# Patient Record
Sex: FEMALE | Race: WHITE | HISPANIC OR LATINO | URBAN - METROPOLITAN AREA
[De-identification: names, ages, dates, MRNs, and addresses within clinical notes are randomized per-mention and may not be internally consistent; named-entity substitution may affect disease eponyms.]

---

## 2018-11-08 ENCOUNTER — EMERGENCY (EMERGENCY)
Facility: HOSPITAL | Age: 38
LOS: 1 days | End: 2018-11-08
Payer: SELF-PAY

## 2018-11-08 PROCEDURE — 99283 EMERGENCY DEPT VISIT LOW MDM: CPT | Mod: 25

## 2018-11-08 PROCEDURE — 10061 I&D ABSCESS COMP/MULTIPLE: CPT

## 2019-02-01 ENCOUNTER — EMERGENCY (EMERGENCY)
Facility: HOSPITAL | Age: 39
LOS: 1 days | End: 2019-02-01
Payer: SELF-PAY

## 2019-02-01 PROCEDURE — 99283 EMERGENCY DEPT VISIT LOW MDM: CPT

## 2023-12-05 ENCOUNTER — APPOINTMENT (OUTPATIENT)
Dept: CARDIOLOGY | Facility: CLINIC | Age: 43
End: 2023-12-05
Payer: MEDICAID

## 2023-12-05 ENCOUNTER — NON-APPOINTMENT (OUTPATIENT)
Age: 43
End: 2023-12-05

## 2023-12-05 VITALS — SYSTOLIC BLOOD PRESSURE: 112 MMHG | DIASTOLIC BLOOD PRESSURE: 76 MMHG

## 2023-12-05 VITALS
DIASTOLIC BLOOD PRESSURE: 70 MMHG | SYSTOLIC BLOOD PRESSURE: 110 MMHG | HEART RATE: 76 BPM | HEIGHT: 60 IN | BODY MASS INDEX: 35.73 KG/M2 | OXYGEN SATURATION: 97 % | WEIGHT: 182 LBS

## 2023-12-05 DIAGNOSIS — F32.A DEPRESSION, UNSPECIFIED: ICD-10-CM

## 2023-12-05 DIAGNOSIS — R05.3 CHRONIC COUGH: ICD-10-CM

## 2023-12-05 DIAGNOSIS — K21.9 GASTRO-ESOPHAGEAL REFLUX DISEASE W/OUT ESOPHAGITIS: ICD-10-CM

## 2023-12-05 DIAGNOSIS — Z78.9 OTHER SPECIFIED HEALTH STATUS: ICD-10-CM

## 2023-12-05 DIAGNOSIS — Z83.3 FAMILY HISTORY OF DIABETES MELLITUS: ICD-10-CM

## 2023-12-05 DIAGNOSIS — R07.89 OTHER CHEST PAIN: ICD-10-CM

## 2023-12-05 DIAGNOSIS — F41.9 ANXIETY DISORDER, UNSPECIFIED: ICD-10-CM

## 2023-12-05 DIAGNOSIS — R06.00 DYSPNEA, UNSPECIFIED: ICD-10-CM

## 2023-12-05 DIAGNOSIS — Z84.1 FAMILY HISTORY OF DISORDERS OF KIDNEY AND URETER: ICD-10-CM

## 2023-12-05 DIAGNOSIS — R94.31 ABNORMAL ELECTROCARDIOGRAM [ECG] [EKG]: ICD-10-CM

## 2023-12-05 PROBLEM — Z00.00 ENCOUNTER FOR PREVENTIVE HEALTH EXAMINATION: Status: ACTIVE | Noted: 2023-12-05

## 2023-12-05 PROCEDURE — 99204 OFFICE O/P NEW MOD 45 MIN: CPT

## 2023-12-05 RX ORDER — FAMOTIDINE 20 MG/1
20 TABLET, FILM COATED ORAL
Qty: 90 | Refills: 3 | Status: ACTIVE | COMMUNITY
Start: 2023-12-05

## 2023-12-05 RX ORDER — ESCITALOPRAM OXALATE 10 MG/1
10 TABLET, FILM COATED ORAL
Qty: 90 | Refills: 3 | Status: ACTIVE | COMMUNITY
Start: 2023-12-05

## 2023-12-05 RX ORDER — MELOXICAM 15 MG/1
15 TABLET ORAL
Refills: 0 | Status: DISCONTINUED | COMMUNITY
End: 2023-12-05

## 2023-12-14 ENCOUNTER — LABORATORY RESULT (OUTPATIENT)
Age: 43
End: 2023-12-14

## 2023-12-14 ENCOUNTER — APPOINTMENT (OUTPATIENT)
Dept: OBGYN | Facility: CLINIC | Age: 43
End: 2023-12-14
Payer: MEDICAID

## 2023-12-14 VITALS
BODY MASS INDEX: 36.32 KG/M2 | HEIGHT: 60 IN | WEIGHT: 185 LBS | DIASTOLIC BLOOD PRESSURE: 85 MMHG | SYSTOLIC BLOOD PRESSURE: 129 MMHG

## 2023-12-14 DIAGNOSIS — Z12.4 ENCOUNTER FOR SCREENING FOR MALIGNANT NEOPLASM OF CERVIX: ICD-10-CM

## 2023-12-14 DIAGNOSIS — Z01.419 ENCOUNTER FOR GYNECOLOGICAL EXAMINATION (GENERAL) (ROUTINE) W/OUT ABNORMAL FINDINGS: ICD-10-CM

## 2023-12-14 DIAGNOSIS — Z12.39 ENCOUNTER FOR OTHER SCREENING FOR MALIGNANT NEOPLASM OF BREAST: ICD-10-CM

## 2023-12-14 PROCEDURE — 99386 PREV VISIT NEW AGE 40-64: CPT

## 2023-12-14 NOTE — HISTORY OF PRESENT ILLNESS
[FreeTextEntry1] : 42 yo in for annual exam Patient reports irregular menses for several years   Denies diabetes  No current bcm denies breast pain/breast mass/nipple dc/skin dimpling Mammogram appointment is already scheduled denies frequent constipation or rectal bleeding or blood in stools denies toxic habits  Fam Hx  maternal cousin breast cancer  PSHx  appendectomy and left ovarian cystectomy 2 years ago

## 2023-12-14 NOTE — PLAN
[FreeTextEntry1] : thin prep with hr hpv Pelvic US  PCOs lab profile  Patient has mammogram scheduled  declines bcm  follow up ~ 2 weeks for results and discussion

## 2023-12-14 NOTE — PHYSICAL EXAM
[Chaperone Present] : A chaperone was present in the examining room during all aspects of the physical examination [FreeTextEntry1] : Traci [Appropriately responsive] : appropriately responsive [Alert] : alert [No Acute Distress] : no acute distress [No Lymphadenopathy] : no lymphadenopathy [Regular Rate Rhythm] : regular rate rhythm [No Murmurs] : no murmurs [Clear to Auscultation B/L] : clear to auscultation bilaterally [Soft] : soft [Non-tender] : non-tender [Non-distended] : non-distended [No Mass] : no mass [Oriented x3] : oriented x3 [FreeTextEntry6] : no breast mass    no skin dimpling   no nipple dc [FreeTextEntry7] : scar from ovarian cystectomy [Examination Of The Breasts] : a normal appearance [No Discharge] : no discharge [No Masses] : no breast masses were palpable [No Lesions] : no lesions  [Labia Majora] : normal [Labia Minora] : normal [Pink Rugae] : pink rugae [No Bleeding] : There was no active vaginal bleeding [Normal] : normal [Normal Position] : in a normal position [Tenderness] : nontender [Enlarged ___ wks] : not enlarged [Mass ___ cm] : no uterine mass was palpated [Uterine Adnexae] : normal [Declined] : Patient declined rectal exam

## 2023-12-15 LAB
DHEA-S SERPL-MCNC: 132 UG/DL
FSH SERPL-MCNC: 4.1 IU/L
HCG SERPL-MCNC: <1 MIU/ML
HCG UR QL: NEGATIVE
HPV HIGH+LOW RISK DNA PNL CVX: NOT DETECTED
LH SERPL-ACNC: 13.6 IU/L
PROLACTIN SERPL-MCNC: 23.4 NG/ML
QUALITY CONTROL: YES
TESTOST FREE SERPL-MCNC: 1 PG/ML
TESTOST SERPL-MCNC: 18.2 NG/DL
TSH SERPL-ACNC: 4.73 UIU/ML

## 2023-12-18 LAB — CYTOLOGY CVX/VAG DOC THIN PREP: NORMAL

## 2023-12-19 ENCOUNTER — APPOINTMENT (OUTPATIENT)
Dept: CARDIOLOGY | Facility: CLINIC | Age: 43
End: 2023-12-19

## 2023-12-19 LAB
17OHP SERPL-MCNC: 48 NG/DL
ANDROST SERPL-MCNC: 88 NG/DL

## 2023-12-20 ENCOUNTER — APPOINTMENT (OUTPATIENT)
Dept: ANTEPARTUM | Facility: CLINIC | Age: 43
End: 2023-12-20
Payer: MEDICAID

## 2023-12-20 ENCOUNTER — ASOB RESULT (OUTPATIENT)
Age: 43
End: 2023-12-20

## 2023-12-20 PROCEDURE — 76856 US EXAM PELVIC COMPLETE: CPT | Mod: 59

## 2023-12-20 PROCEDURE — 76830 TRANSVAGINAL US NON-OB: CPT

## 2023-12-28 ENCOUNTER — APPOINTMENT (OUTPATIENT)
Dept: OBGYN | Facility: CLINIC | Age: 43
End: 2023-12-28
Payer: MEDICAID

## 2023-12-28 VITALS
HEIGHT: 64 IN | BODY MASS INDEX: 31.58 KG/M2 | SYSTOLIC BLOOD PRESSURE: 116 MMHG | DIASTOLIC BLOOD PRESSURE: 73 MMHG | WEIGHT: 185 LBS

## 2023-12-28 DIAGNOSIS — R21 RASH AND OTHER NONSPECIFIC SKIN ERUPTION: ICD-10-CM

## 2023-12-28 DIAGNOSIS — E03.9 HYPOTHYROIDISM, UNSPECIFIED: ICD-10-CM

## 2023-12-28 DIAGNOSIS — N92.6 IRREGULAR MENSTRUATION, UNSPECIFIED: ICD-10-CM

## 2023-12-28 DIAGNOSIS — N84.0 POLYP OF CORPUS UTERI: ICD-10-CM

## 2023-12-28 PROCEDURE — 99213 OFFICE O/P EST LOW 20 MIN: CPT

## 2023-12-28 NOTE — PHYSICAL EXAM
[Chaperone Present] : A chaperone was present in the examining room during all aspects of the physical examination [FreeTextEntry1] : L [Appropriately responsive] : appropriately responsive [Alert] : alert [No Acute Distress] : no acute distress [Oriented x3] : oriented x3

## 2023-12-28 NOTE — PLAN
[FreeTextEntry1] : set up MyoSure Hysteroscopy D&C and insertion of Mirena IUD  patient to follow up with PCP for evaluation of skin lesions and elevated TSH

## 2023-12-28 NOTE — HISTORY OF PRESENT ILLNESS
[FreeTextEntry1] : 44 yo in for follow up after lab work and Pelvic US for heavy and irregular bleeding. TSH elevated and Pelvic US demonstrates endometrial polyp   Simple right ovarian cyst of no clinical signficiance  Patient reports rash on body and face since Wednesday   Reports  had similar lesions within 1 month Reports fevers  Patient encouraged to follow up with PCP for further evaluation of lesions and further follow up on elevated TSH patient voiced understanding  Patient offered MyoSure Hysteroscopy d&c for removal of endometrial polyp and insertion of Mirena IUD to help control heavy menses patient agrees

## 2024-01-04 ENCOUNTER — APPOINTMENT (OUTPATIENT)
Dept: CARDIOLOGY | Facility: CLINIC | Age: 44
End: 2024-01-04

## 2024-01-18 ENCOUNTER — APPOINTMENT (OUTPATIENT)
Dept: MAMMOGRAPHY | Facility: CLINIC | Age: 44
End: 2024-01-18
Payer: MEDICAID

## 2024-01-18 PROCEDURE — 77067 SCR MAMMO BI INCL CAD: CPT

## 2024-01-18 PROCEDURE — 77063 BREAST TOMOSYNTHESIS BI: CPT

## 2024-01-19 ENCOUNTER — APPOINTMENT (OUTPATIENT)
Dept: UROGYNECOLOGY | Facility: CLINIC | Age: 44
End: 2024-01-19
Payer: MEDICAID

## 2024-01-19 VITALS
SYSTOLIC BLOOD PRESSURE: 110 MMHG | HEIGHT: 72 IN | WEIGHT: 180 LBS | BODY MASS INDEX: 24.38 KG/M2 | DIASTOLIC BLOOD PRESSURE: 82 MMHG

## 2024-01-19 DIAGNOSIS — N89.8 OTHER SPECIFIED NONINFLAMMATORY DISORDERS OF VAGINA: ICD-10-CM

## 2024-01-19 PROCEDURE — 99459 PELVIC EXAMINATION: CPT | Mod: 25

## 2024-01-19 PROCEDURE — 51701 INSERT BLADDER CATHETER: CPT | Mod: 59

## 2024-01-19 PROCEDURE — 99204 OFFICE O/P NEW MOD 45 MIN: CPT | Mod: 25

## 2024-01-19 PROCEDURE — 81003 URINALYSIS AUTO W/O SCOPE: CPT | Mod: QW

## 2024-01-19 NOTE — PROCEDURE
[Straight Catheterization] : insertion of a straight catheter [Stress Incontinence] : stress incontinence [Patient] : the patient [___ Fr Straight Tip] : a [unfilled] in [place] straight tip catheter [None] : there were no complications with the catheter insertion [Clear] : clear [Culture] : culture [Urinalysis] : urinalysis [No Complications] : no complications [Tolerated Well] : the patient tolerated the procedure well [Post procedure instructions and information given] : Post procedure instructions and information were given and reviewed with patient. [0] : 0

## 2024-01-19 NOTE — DISCUSSION/SUMMARY
[FreeTextEntry1] : The patient was counseled regarding the pathophysiology, evaluation and management of mixed urinary incontinence.  She was also counseled regarding the risks, benefits, indications, and alternatives of further evaluations studies, as well as various management options. She was given verbal and written information/education on pelvic floor muscle exercises, avoidance of dietary bladder irritants, and other strategies to improve bladder control. She was counseled regarding treatment options for stress urinary incontinence including pelvic floor PT, pessary placement and surgical management with midurethral sling. AUGS interactive tool was used to explain normal anatomy as well as alteration in urethral support associated with stress urinary incontinence. Midurethral sling placement as well as urethral bulking was discussed. Pharmacologic management of overactive bladder and urgency incontinence was discussed. After a detailed discussion, following management plan was outlined: 1.  Patient is interested in surgical management of stress urinary incontinence.  We discussed the procedure of retropubic mid urethral sling placement.  Outpatient nature of the procedure, postop restrictions etc. reviewed.  Advised her to undergo urodynamic testing to exclude any contraindication for the procedure.  She verbalized understanding.  Urodynamic testing ordered.  OR booking sheet submitted.  She is interested in a combined procedure.  I have sent a message to Dr. Garsia to coordinate surgical planning.   2. UA with micro and urine culture was ordered to exclude UTI.  If there is evidence of urinary tract infection, will recommend antibiotic such as Keflex 500 mg p.o. twice daily for 7 days. 3.  Discussed management of urgency symptoms with intake of medication such as Myrbetriq 25 mg p.o. daily or Solifenacin 5 mg p.o. daily.  She would like to undergo the urodynamic testing first. 4. Discussed importance of maintaining optimal glycemic control in for bladder health. 5.  Advised following following up with her PCP for thyroid nodule and repeat thyroid function test.  She verbalized understanding Of note, patient is Latvian-speaking.  The entire visit was conducted with the help of Latvian translation provided by medical assistant Arielle Guzmán per patient's preference.

## 2024-01-19 NOTE — HISTORY OF PRESENT ILLNESS
[FreeTextEntry1] :  patient is a 43-year-old para 3 ( x 3) who is referred by Fr. Garsia for evaluation and management of  patient reports leaking with cough, sneeze, laugh sex for past 10 years with gradual worsening over the past 5 years. She went to a doctor in [place] ~ 10 years ago who advised her to undergo PT which didn't make a significant difference. She also reports occasional strong urge to void and sometimes doesn't make it Pad use : typically 1 pad last whole day Daytime frequency: 5 times Nocturia: 0 (stops drinking fluid in the night) Sensation of incomplete bladder emptying:  she never feels empyty Recurrent UTI: denies  Kidney stones: denies Hx of hematuria: denies   Vaginal symptoms: denies vaginal bulge or pressure. Reports pain with sex, not using any lubricant  Bowel sym[ptoms: reports chronic constipation- not on any bowel regimen, denies diarrhea, denies fecal incontinence   GYN Hx: LMP: irregular / heavy period for past 13 years.  Her recent pelvic under ultrasound showed endometrial polyp.  Her GYN Dr. Garsia has recommended hysteroscopy/polypectomy/ Mirena IUD placement.  She has been receiving calls from his office but has not scheduled her surgery yet  PMHx: obesity, prediabetes- not on any medication, hypothyroidism (borderline- not on any medication)  PShx: Laparotomy for appendectomy in  at [facility], laparotomy for ovarian cystectomy in 2020 in [facility]

## 2024-01-19 NOTE — PHYSICAL EXAM
[Chaperone Present] : A chaperone was present in the examining room during all aspects of the physical examination [FreeTextEntry1] : General: Not in acute distress, alert and oriented x3. Neck: Supple. No lymphadenopathy. ? small thyroid nodule Abdomen: Soft, nontender, and nondistended. No obvious hepatosplenomegaly. No obvious hernias. A well-healed RLQ scar of previous appendectomy, Suprapubic scar of previous laparotomy for ovarian cystectomy.  Pelvic Exam: Normal external female genitalia. Saddle sensory exam S2 to S4 is intact. Perineal reflexes not visualized. Urethra is hypermobile without prolapse, exudates, or lesions. Cough stress test is POSITIVE with loss of brisk volume of urine.  Post void residual was checked with I/O cath and was 90 cc of clear urine.  Cough stress test was negative after In-N-Out cath.  Normal appearing vaginal epithelium. No vaginal blood but yellowish green discharge was noted with odor.  Affirm and GC swabs were collected.   Cervix without abnormal lesions. Bimanual exam reveals a small uterus in normal positioning. No palpable adnexal masses or tenderness.  Vaginal walls are parous however no prolapse noted to or beyond hymen.

## 2024-01-19 NOTE — ASSESSMENT
[FreeTextEntry1] : Patient is a 43-year-old multipara with symptoms of mixed urinary incontinence (stress greater than urgency) and coital incontinence for past 10 years with gradual worsening over the past 5 years.  On exam she has a positive cough stress test prior to In-N-Out cath, hypermobile urethra and a normal postvoid residual.  Patient is also experiencing heavy irregular bleeding.  She has a endometrial polyp.  She has been advised to undergo hysteroscopy, D&C/polypectomy, Mirena IUD insertion.

## 2024-01-22 DIAGNOSIS — N76.0 ACUTE VAGINITIS: ICD-10-CM

## 2024-01-22 DIAGNOSIS — B96.89 ACUTE VAGINITIS: ICD-10-CM

## 2024-01-22 LAB
APPEARANCE: CLEAR
BACTERIA UR CULT: NORMAL
BACTERIA: NEGATIVE /HPF
BILIRUBIN URINE: NEGATIVE
BLOOD URINE: NEGATIVE
C TRACH RRNA SPEC QL NAA+PROBE: NOT DETECTED
CANDIDA VAG CYTO: NOT DETECTED
CAST: 0 /LPF
COLOR: YELLOW
EPITHELIAL CELLS: 1 /HPF
G VAGINALIS+PREV SP MTYP VAG QL MICRO: DETECTED
GLUCOSE QUALITATIVE U: NEGATIVE MG/DL
KETONES URINE: NEGATIVE MG/DL
LEUKOCYTE ESTERASE URINE: NEGATIVE
MICROSCOPIC-UA: NORMAL
N GONORRHOEA RRNA SPEC QL NAA+PROBE: NOT DETECTED
NITRITE URINE: NEGATIVE
PH URINE: 6
PROTEIN URINE: NEGATIVE MG/DL
RED BLOOD CELLS URINE: 0 /HPF
SOURCE AMPLIFICATION: NORMAL
SPECIFIC GRAVITY URINE: 1.02
T VAGINALIS VAG QL WET PREP: NOT DETECTED
UROBILINOGEN URINE: 0.2 MG/DL
WHITE BLOOD CELLS URINE: 0 /HPF

## 2024-01-22 RX ORDER — METRONIDAZOLE 500 MG/1
500 TABLET ORAL
Qty: 14 | Refills: 0 | Status: ACTIVE | COMMUNITY
Start: 2024-01-22 | End: 1900-01-01

## 2024-01-23 ENCOUNTER — APPOINTMENT (OUTPATIENT)
Dept: ULTRASOUND IMAGING | Facility: CLINIC | Age: 44
End: 2024-01-23
Payer: MEDICAID

## 2024-01-23 PROCEDURE — 76536 US EXAM OF HEAD AND NECK: CPT

## 2024-02-01 ENCOUNTER — APPOINTMENT (OUTPATIENT)
Dept: ULTRASOUND IMAGING | Facility: CLINIC | Age: 44
End: 2024-02-01
Payer: MEDICAID

## 2024-02-01 ENCOUNTER — APPOINTMENT (OUTPATIENT)
Dept: MAMMOGRAPHY | Facility: CLINIC | Age: 44
End: 2024-02-01
Payer: MEDICAID

## 2024-02-01 PROCEDURE — 76642 ULTRASOUND BREAST LIMITED: CPT | Mod: RT

## 2024-02-01 PROCEDURE — 77065 DX MAMMO INCL CAD UNI: CPT | Mod: LT

## 2024-02-06 ENCOUNTER — APPOINTMENT (OUTPATIENT)
Dept: UROGYNECOLOGY | Facility: CLINIC | Age: 44
End: 2024-02-06

## 2024-02-16 ENCOUNTER — APPOINTMENT (OUTPATIENT)
Dept: UROGYNECOLOGY | Facility: CLINIC | Age: 44
End: 2024-02-16
Payer: MEDICAID

## 2024-02-16 DIAGNOSIS — N39.46 MIXED INCONTINENCE: ICD-10-CM

## 2024-02-16 DIAGNOSIS — N36.8 OTHER SPECIFIED DISORDERS OF URETHRA: ICD-10-CM

## 2024-02-16 DIAGNOSIS — N39.3 STRESS INCONTINENCE (FEMALE) (MALE): ICD-10-CM

## 2024-02-16 PROCEDURE — 51784 ANAL/URINARY MUSCLE STUDY: CPT

## 2024-02-16 PROCEDURE — 51728 CYSTOMETROGRAM W/VP: CPT

## 2024-02-16 PROCEDURE — 51741 ELECTRO-UROFLOWMETRY FIRST: CPT

## 2024-02-16 PROCEDURE — 81003 URINALYSIS AUTO W/O SCOPE: CPT | Mod: QW

## 2024-02-16 PROCEDURE — 51797 INTRAABDOMINAL PRESSURE TEST: CPT

## 2024-02-16 PROCEDURE — 99214 OFFICE O/P EST MOD 30 MIN: CPT | Mod: 25

## 2024-02-16 RX ORDER — PHENAZOPYRIDINE 200 MG/1
200 TABLET, FILM COATED ORAL 3 TIMES DAILY
Qty: 15 | Refills: 0 | Status: ACTIVE | COMMUNITY
Start: 2024-02-16 | End: 1900-01-01

## 2024-02-17 LAB
APPEARANCE: CLEAR
BACTERIA: NEGATIVE /HPF
BILIRUBIN URINE: NEGATIVE
BLOOD URINE: NEGATIVE
CAST: 0 /LPF
COLOR: YELLOW
EPITHELIAL CELLS: 1 /HPF
GLUCOSE QUALITATIVE U: NEGATIVE MG/DL
KETONES URINE: NEGATIVE MG/DL
LEUKOCYTE ESTERASE URINE: NEGATIVE
MICROSCOPIC-UA: NORMAL
NITRITE URINE: NEGATIVE
PH URINE: 6.5
PROTEIN URINE: NEGATIVE MG/DL
RED BLOOD CELLS URINE: 0 /HPF
SPECIFIC GRAVITY URINE: 1
UROBILINOGEN URINE: 0.2 MG/DL
WHITE BLOOD CELLS URINE: 0 /HPF

## 2024-02-19 LAB — BACTERIA UR CULT: NORMAL

## 2024-03-01 ENCOUNTER — NON-APPOINTMENT (OUTPATIENT)
Age: 44
End: 2024-03-01

## 2024-03-01 ENCOUNTER — APPOINTMENT (OUTPATIENT)
Dept: UROGYNECOLOGY | Facility: CLINIC | Age: 44
End: 2024-03-01
Payer: MEDICAID

## 2024-03-01 PROCEDURE — 99214 OFFICE O/P EST MOD 30 MIN: CPT

## 2024-03-01 NOTE — PHYSICAL EXAM
[Chaperone Present] : A chaperone was present in the examining room during all aspects of the physical examination [No Acute Distress] : in no acute distress [Well developed] : well developed [Oriented x3] : oriented to person, place, and time [FreeTextEntry1] : Arielle

## 2024-03-01 NOTE — HISTORY OF PRESENT ILLNESS
[FreeTextEntry1] : Patient is a 43-year-old multipara with symptoms of mixed urinary incontinence (stress greater than urgency) and coital incontinence for past 10 years with gradual worsening over the past 5 years. On exam she has a positive cough stress test prior to In-N-Out cath, hypermobile urethra and a normal postvoid residual. Patient is also experiencing heavy irregular bleeding. She has a endometrial polyp. She has been advised to undergo hysteroscopy, D&C/polypectomy, Mirena IUD insertion.  She presented for urodynamic testing on 3/16/2024.  Her urodynamic test findings include an interrupted uroflow with voided volume of 310 cc, max flow rate of 10, average flow rate of 4, and voiding time of 73 seconds: Her complex cystometrogram showed normal sensation, cystometric capacity of 300 cc, absence of detrusor overactivity during filling and presence of a stress urinary incontinence with cough and Valsalva starting at filled volume of 100 cc; she voided 476 cc for pressure voiding study with max flow rate of 22 and detrusor pressure of 12 cm of water.

## 2024-03-01 NOTE — ASSESSMENT
[FreeTextEntry1] :  The above test findings are consistent with normal sensation, normal compliance, normal cystometric capacity, absence of detrusor overactivity, presence of a stress urinary incontinence and absence of voiding dysfunction.  Patient appears to be a reasonable candidate for retropubic mid urethral sling placement.

## 2024-03-02 NOTE — HISTORY OF PRESENT ILLNESS
[FreeTextEntry1] : Patient is a 43-year-old multipara with symptoms of mixed urinary incontinence (stress greater than urgency) and coital incontinence for past 10 years with gradual worsening over the past 5 years. On exam she has a positive cough stress test prior to In-N-Out cath, hypermobile urethra and a normal postvoid residual. Patient is also experiencing heavy irregular bleeding. She has a endometrial polyp. She has been advised to undergo hysteroscopy, D&C/polypectomy, Mirena IUD insertion. She presented for urodynamic testing on 3/16/2024. Her urodynamic test findings include an interrupted uroflow with voided volume of 310 cc, max flow rate of 10, average flow rate of 4, and voiding time of 73 seconds: Her complex cystometrogram showed normal sensation, cystometric capacity of 300 cc, absence of detrusor overactivity during filling and presence of a stress urinary incontinence with cough and Valsalva starting at filled volume of 100 cc; she voided 476 cc for pressure voiding study with max flow rate of 22 and detrusor pressure of 12 cm of water. Patient is interested in retropubic mid urethral sling placement.  She presents today with her friend. She has no new concerns.

## 2024-03-02 NOTE — PHYSICAL EXAM
[No Acute Distress] : in no acute distress [Oriented x3] : oriented to person, place, and time [Well developed] : well developed

## 2024-03-02 NOTE — DISCUSSION/SUMMARY
[FreeTextEntry1] :  I discussed the urodynamic procedure finding in detail. She was again counseled regarding treatment options for stress urinary incontinence including pelvic floor PT, pessary placement and surgical management with midurethral sling. AUGS interactive tool was used to explain normal anatomy as well as alteration in urethral support associated with stress urinary incontinence.  She understands that the midurethral sling is meant to address her stress type urinary incontinence and may not improve or prevent the urgency incontinence. I discussed the potential risks and complications associated with midurethral sling procedure including changes in urinary stream including weak stream, need to spend additional time emptying bladder, voiding dysfunction, temporary bladder catheterization, recurrent urinary tract infection, mesh related complication, pelvic pain/ dyspareunia, injury to bladder/ urethra and need for future surgery etc. she was interested in a combined procedure with Dr. Garsia. Of note patient is Norwegian-speaking.  The entire visit was conducted with the help of Norwegian translation provided by medical assistant Arielle Guzmán

## 2024-05-02 ENCOUNTER — APPOINTMENT (OUTPATIENT)
Dept: OBGYN | Facility: HOSPITAL | Age: 44
End: 2024-05-02

## 2024-05-02 ENCOUNTER — RESULT REVIEW (OUTPATIENT)
Age: 44
End: 2024-05-02

## 2024-05-02 ENCOUNTER — APPOINTMENT (OUTPATIENT)
Dept: UROGYNECOLOGY | Facility: HOSPITAL | Age: 44
End: 2024-05-02

## 2024-05-02 DIAGNOSIS — G89.18 OTHER ACUTE POSTPROCEDURAL PAIN: ICD-10-CM

## 2024-05-02 RX ORDER — OXYCODONE 5 MG/1
5 TABLET ORAL EVERY 8 HOURS
Qty: 6 | Refills: 0 | Status: ACTIVE | COMMUNITY
Start: 2024-05-02 | End: 1900-01-01

## 2024-05-09 ENCOUNTER — APPOINTMENT (OUTPATIENT)
Dept: UROGYNECOLOGY | Facility: CLINIC | Age: 44
End: 2024-05-09
Payer: COMMERCIAL

## 2024-05-09 DIAGNOSIS — Z98.890 OTHER SPECIFIED POSTPROCEDURAL STATES: ICD-10-CM

## 2024-05-09 DIAGNOSIS — R39.15 URGENCY OF URINATION: ICD-10-CM

## 2024-05-09 PROCEDURE — 51701 INSERT BLADDER CATHETER: CPT | Mod: 58

## 2024-05-09 PROCEDURE — 81003 URINALYSIS AUTO W/O SCOPE: CPT | Mod: QW

## 2024-05-09 PROCEDURE — 99024 POSTOP FOLLOW-UP VISIT: CPT

## 2024-05-09 NOTE — SUBJECTIVE
[FreeTextEntry1] : Patient is a 43-year-old multipara with symptoms of mixed urinary incontinence as well as coital incontinence who underwent retropubic mid urethral sling placement, posterior colpoperineorrhaphy, cystoscopy by me as well as hysteroscopy, myomectomy, D&C and Mirena IUD placement by Dr. Garsia on 5/2/2024.  She presents today for scheduled postop visit.  She is accompanied by her niece.  Patient reports doing very well.  She is very happy that she is no longer leaking urine.  She denies constipation.  She is little sore however overall the pain is controlled.  She reports regular bowel movements.  She has slight spotting.  Her niece states that she has been driving the patient for her appointment.   niece is wondering when patient can return to work

## 2024-05-09 NOTE — DISCUSSION/SUMMARY
[Post-Op instructions given. Pt/family verbalizes understanding] : post-operative instructions were provided to the patient/family who verbalize understanding [FreeTextEntry1] : Urine culture was ordered on catheter specimen to exclude urinary tract infection.  There is evidence of urinary tract infection, will recommend antibiotic such as Keflex 500 mg p.o. twice daily for 7 days May return to work after 1 week Continue all the postoperative restrictions. Follow-up in 5-6 weeks

## 2024-05-09 NOTE — ASSESSMENT
[FreeTextEntry1] : Patient is making excellent postoperative recovery.  Her postvoid residual is within normal limits.

## 2024-05-10 LAB
APPEARANCE: CLEAR
BACTERIA: NEGATIVE /HPF
BILIRUBIN URINE: NEGATIVE
BLOOD URINE: NEGATIVE
CAST: 0 /LPF
COLOR: YELLOW
EPITHELIAL CELLS: 3 /HPF
GLUCOSE QUALITATIVE U: NEGATIVE MG/DL
KETONES URINE: NEGATIVE MG/DL
LEUKOCYTE ESTERASE URINE: NEGATIVE
MICROSCOPIC-UA: NORMAL
NITRITE URINE: NEGATIVE
PH URINE: 6
PROTEIN URINE: NEGATIVE MG/DL
RED BLOOD CELLS URINE: 0 /HPF
SPECIFIC GRAVITY URINE: 1.01
UROBILINOGEN URINE: 0.2 MG/DL
WHITE BLOOD CELLS URINE: 0 /HPF

## 2024-05-13 LAB — BACTERIA UR CULT: NORMAL

## 2024-05-28 ENCOUNTER — OUTPATIENT (OUTPATIENT)
Dept: OUTPATIENT SERVICES | Facility: HOSPITAL | Age: 44
LOS: 1 days | End: 2024-05-28
Payer: COMMERCIAL

## 2024-05-28 DIAGNOSIS — G47.33 OBSTRUCTIVE SLEEP APNEA (ADULT) (PEDIATRIC): ICD-10-CM

## 2024-05-28 PROCEDURE — 95800 SLP STDY UNATTENDED: CPT

## 2024-05-28 PROCEDURE — 95800 SLP STDY UNATTENDED: CPT | Mod: 26

## 2024-06-10 ENCOUNTER — APPOINTMENT (OUTPATIENT)
Dept: CT IMAGING | Facility: CLINIC | Age: 44
End: 2024-06-10

## 2024-08-08 ENCOUNTER — APPOINTMENT (OUTPATIENT)
Dept: OBGYN | Facility: CLINIC | Age: 44
End: 2024-08-08

## 2024-08-08 PROCEDURE — 99213 OFFICE O/P EST LOW 20 MIN: CPT

## 2024-08-08 PROCEDURE — 99459 PELVIC EXAMINATION: CPT

## 2024-08-08 NOTE — PHYSICAL EXAM
[Chaperone Present] : A chaperone was present in the examining room during all aspects of the physical examination [38328] : A chaperone was present during the pelvic exam. [FreeTextEntry2] : Cleo [Appropriately responsive] : appropriately responsive [Alert] : alert [No Acute Distress] : no acute distress [Soft] : soft [Non-tender] : non-tender [Non-distended] : non-distended [Oriented x3] : oriented x3 [Labia Majora] : normal [Labia Minora] : normal [Pink Rugae] : pink rugae [No Bleeding] : There was no active vaginal bleeding [IUD String] : an IUD string was noted [Normal] : normal [Tenderness] : nontender [Enlarged ___ wks] : not enlarged [Mass ___ cm] : no uterine mass was palpated [Uterine Adnexae] : normal [Declined] : Patient declined rectal exam [FreeTextEntry5] : tip of IUD seen at os

## 2024-08-08 NOTE — HISTORY OF PRESENT ILLNESS
[FreeTextEntry1] : 42 yo s/p hysteroscopy d&c with IUD insertion Mirena 5/2/24 Patient reports light bleeding since procedure, but stopped over 3 weeks ago  Denies pelvic pain, fever/chills, or nausea/emesis.  On examination, tip of IUD seen at os. Patient given option of removal and observation or replacement. If removal and observation, she could consider replacing IUD or endometrial ablation if bleeding/menses is heavy  Patient opts to wait and not have IUD Mirena removed.  She again confirms she has stopped bleeding

## 2024-08-08 NOTE — PLAN
[FreeTextEntry1] : observation follow up Gyn as needed return if bleeding increases or is persistent  ER warnings given

## 2024-08-28 ENCOUNTER — APPOINTMENT (OUTPATIENT)
Dept: MAMMOGRAPHY | Facility: CLINIC | Age: 44
End: 2024-08-28

## 2024-08-28 ENCOUNTER — APPOINTMENT (OUTPATIENT)
Dept: ULTRASOUND IMAGING | Facility: CLINIC | Age: 44
End: 2024-08-28
Payer: COMMERCIAL

## 2024-08-28 PROCEDURE — G0279: CPT

## 2024-08-28 PROCEDURE — 77066 DX MAMMO INCL CAD BI: CPT

## 2024-08-28 PROCEDURE — 77062 BREAST TOMOSYNTHESIS BI: CPT

## 2024-08-28 PROCEDURE — 76642 ULTRASOUND BREAST LIMITED: CPT | Mod: RT
